# Patient Record
Sex: MALE | Race: WHITE
[De-identification: names, ages, dates, MRNs, and addresses within clinical notes are randomized per-mention and may not be internally consistent; named-entity substitution may affect disease eponyms.]

---

## 2019-08-13 ENCOUNTER — HOSPITAL ENCOUNTER (EMERGENCY)
Dept: HOSPITAL 75 - ER | Age: 49
Discharge: HOME | End: 2019-08-13
Payer: SELF-PAY

## 2019-08-13 VITALS — BODY MASS INDEX: 26.41 KG/M2 | WEIGHT: 195 LBS | HEIGHT: 72 IN

## 2019-08-13 VITALS — SYSTOLIC BLOOD PRESSURE: 147 MMHG | DIASTOLIC BLOOD PRESSURE: 98 MMHG

## 2019-08-13 DIAGNOSIS — T18.128A: Primary | ICD-10-CM

## 2019-08-13 DIAGNOSIS — J45.909: ICD-10-CM

## 2019-08-13 DIAGNOSIS — Z87.891: ICD-10-CM

## 2019-08-13 NOTE — CONSULTATION - SURGERY
GRAHAM DUBON,MED STUDENT 8/13/19 2217:


History of Present Illness


History of Present Illness


Patient Consulted On(coco/time)


8/13/19


 22:00


Date Seen by Provider:  Aug 13, 2019


Time Seen by Provider:  22:00


History of Present Illness


Consultation as requested for food bolus and esophageal obstruction. Patient 

seen and evaluated in ED





Patient presents with food bolus after eating chicken last night at 

approximately 9.00pm. He is spitting up his secretions and states that he cannot

get things to go down. He reports a history of similar episode 12 years ago 

after eating a piece of steak at which time he required a scope to push the 

bolus down and he was told he had scarring in the lower esophagus.





Allergies and Home Medications


Allergies


Coded Allergies:  


     No Known Drug Allergies (Unverified , 6/1/14)





Home Medications


Budesonide/Formoterol Fumarate 10.2 Gm Hfa.aer.ad, 2 PUFF IH BID, (Reported)


Pantoprazole Sodium 40 Mg Tablet.dr, 40 MG PO DAILY


   Prescribed by: THEODORA LOERA on 8/13/19 3950





Patient Home Medication List


Home Medication List Reviewed:  Yes





Past Medical-Social-Family Hx


Patient Social History


Alcohol Use:  Denies Use


Recreational Drug Use:  No


Smoking Status:  Former Smoker


Recent Foreign Travel:  No


Contact w/Someone Who Travel:  No


Recent Infectious Disease Expo:  No





Immunizations Up To Date


Tetanus Booster (TDap):  Less than 5yrs


PED Vaccines UTD:  Yes





Surgeries


History of Surgeries:  Yes (FOOD BOLUS REMOVED)


Surgeries:  Orthopedic





Respiratory


History of Respiratory Disorde:  Yes


Respiratory Disorders:  Asthma





Cardiovascular


History of Cardiac Disorders:  No





Neurological


History of Neurological Disord:  No





Gastrointestinal


History of Gastrointestinal Di:  No





Musculoskeletal


History of Musculoskeletal Dis:  No





Endocrine


History of Endocrine Disorders:  No





Cancer


History of Cancer:  No





Psychosocial


History of Psychiatric Problem:  No





Integumentary


History of Skin or Integumenta:  No





Blood Transfusions


History of Blood Disorders:  No





Review of Systems-General


Constitutional:  no symptoms reported


EENTM:  no symptoms reported


Respiratory:  no symptoms reported


Cardiovascular:  no symptoms reported


Gastrointestinal:  see HPI, dysphagia


Genitourinary:  no symptoms reported


Musculoskeletal:  no symptoms reported


Skin:  no symptoms reported


Psychiatric/Neurological:  No Symptoms Reported





Physical Exam-General Problems


Physical Exam


Vital Signs





Vital Signs - First Documented








 8/13/19





 19:55


 


Temp 98.2


 


Pulse 79


 


Resp 18


 


B/P (MAP) 159/99 (119)


 


Pulse Ox 97


 


O2 Delivery Room Air





Capillary Refill : Less Than 3 Seconds


General Appearance:  WD/WN, no apparent distress


HEENT:  PERRL/EOMI, pharynx normal


Neck:  non-tender, full range of motion, supple, normal inspection


Respiratory:  chest non-tender, no respiratory distress, no accessory muscle use


Cardiovascular:  regular rate, rhythm


Gastrointestinal:  non tender, soft


Rectal:  deferred


Back:  normal inspection, no CVA tenderness


Extremities:  normal range of motion, non-tender


Neurologic/Psychiatric:  no motor/sensory deficits, alert, normal mood/affect, 

oriented x 3


Skin:  normal color, warm/dry


Lymphatic:  no adenopathy





Assessment/Plan


Food bolus


Esophageal obstruction





Patient is unable to swallow his secretions so we will proceed with endoscopy. 

Risks and benefits of procedure discussed and patient wishes to proceed.





THEODORA LOERA DO 8/13/19 5131:


History of Present Illness


Patient with feeling of food lodged in esophagus since 9 pm yesterday.  Patient 

unable to keep secretions down and liquids.  Not eating today.  Nothing really 

making things better and trying to drink something makes worse.  Patient had 

previous episode with steak about 12 years ago. This occurred after eating 

chicken.  Patient had endoscopy 12 years ago and states was told had some 

scarring.  Patient no other complaints at this time.





Allergies and Home Medications


Allergies


Coded Allergies:  


     No Known Drug Allergies (Unverified , 6/1/14)





Home Medications


Budesonide/Formoterol Fumarate 10.2 Gm Hfa.aer.ad, 2 PUFF IH BID, (Reported)


Pantoprazole Sodium 40 Mg Tablet.dr, 40 MG PO DAILY


   Prescribed by: THEODORA LOERA on 8/13/19 5125





Patient Home Medication List


Home Medication List Reviewed:  Yes





Past Medical-Social-Family Hx


Patient Social History


Alcohol Use:  Denies Use


Recreational Drug Use:  No


Smoking Status:  Former Smoker





Surgeries


History of Surgeries:  Yes (FOOD BOLUS REMOVED)


Surgeries:  Orthopedic





Respiratory


History of Respiratory Disorde:  Yes


Respiratory Disorders:  Asthma





Family Medical History


Significant Family History:  No Pertinent Family Hx





Review of Systems-General


Constitutional:  no symptoms reported


EENTM:  no symptoms reported


Respiratory:  no symptoms reported


Cardiovascular:  no symptoms reported


Gastrointestinal:  see HPI, dysphagia


Genitourinary:  no symptoms reported


Musculoskeletal:  no symptoms reported


Skin:  no symptoms reported


Psychiatric/Neurological:  No Symptoms Reported





Physical Exam-General Problems


Physical Exam


General Appearance:  WD/WN, no apparent distress


HEENT:  PERRL/EOMI, pharynx normal


Neck:  non-tender, full range of motion, supple, normal inspection


Respiratory:  chest non-tender, no respiratory distress, no accessory muscle use


Cardiovascular:  regular rate, rhythm


Gastrointestinal:  non tender, soft, no organomegaly, no pulsatile mass


Rectal:  deferred


Back:  normal inspection, no CVA tenderness


Extremities:  normal range of motion, non-tender


Neurologic/Psychiatric:  CNs II-XII nml as tested, no motor/sensory deficits, 

alert, normal mood/affect, oriented x 3


Skin:  normal color, warm/dry


Lymphatic:  no adenopathy





Assessment/Plan


esophageal obstruction


food bolus





discussed risks and benefits of EGD to further evaluate and if obstruction to 

remove he understands all risks and benefits and wishes to proceed.





Patient taken for procedure and felt bolus pass.


Patient wishes to hold off.  He is able to keep liquids down without difficulty.


He wishes to be discharged.


Will start on protonix.


Follow up with me in 3 weeks and will plan outpatient EGD


Instructed if any issues before then be seen at that time.


Liquid diet  then advance


Patient understands and agrees with plan





Supervisory-Addendum Brief


Verification & Attestation


Participated in pt care:  history, MDM, physical


Personally performed:  exam, history, MDM, supervision of care


Care discussed with:  Medical Student


Procedures:  n/a


Results interpretation:  Verified all documentation


Verification and Attestation of Medical Student E/M Service





A medical student performed and documented this service in my presence. I 

reviewed and verified all information documented by the medical student and made

modifications to such information, when appropriate. I personally performed the 

physical exam and medical decision making. 





 Theodora Loera, Aug 13, 2019,23:48











GRAHAM DUBON,MED STUDENT       Aug 13, 2019 22:17


THEODORA LOERA DO              Aug 13, 2019 23:47

## 2019-08-13 NOTE — DISCHARGE INST-SIMPLE/STANDARD
Discharge Inst-Standard


Discharge Medications


New, Converted or Re-Newed RX:  Transmitted to Pharmacy





Patient Instructions/Follow Up


Plan of Care/Instructions/FU:  


Dr. Loera  appointment for 3 weeks.  Any issues before that be seen at


that time.


Activity as Tolerated:  Yes


Discharge Diet:  Liquid Diet (for 2 days then advance as tolerates.)











THEODORA LOERA DO              Aug 13, 2019 23:10

## 2019-08-13 NOTE — NUR
2230 TO ED WITH W/C TO TRANSFER PATIENT TO O.R. FOR EGD/FOOD BOLUS. A/O X 4, PERMIT SIGNED, 
H.L. PATENT LEFT AC SPACE WITH REPORT RECEIVED FROM PHI MCADAMS. 2240 PATIENT IN PRE OP 
HOLDING AREA AND STATES HE CAN NOW SWALLOW "IT WENT DOWN". DR. HAMMOND NOTIFIED AND CAME AND 
TALKED TO PATIENT AND PROCEDURE WAS CANCELED. 2300 TAKEN BACK TO E.D. VIA W/C AND PLACED IN 
ROOM #3 WITH REPORT GIVEN TO PHI FOR DISCHARGE, DR. HAMMOND TALKING WITH PATIENT AGAIN AT 
THIS TIME. NO CHANGE, NO C/O.

## 2019-08-15 NOTE — ED GI
General


Chief Complaint:  Oral/Throat Problems


Stated Complaint:  FOOD BOLUS


Nursing Triage Note:  


PT VERBALIZED THAT HE HAS A HX OF FOOD BOLUS THAT HAD TO BE REMOVED VIA SCOPE, 


PT STATES THAT HE ATE SOMETHING AT 2100 LAST NIGHT THAT HE FEELS IS STILL THERE,




PT VERBALIZED THAT HE CANT EVEN SWALLOW HIS SALIVA, BASIN PROVIDED, PROVIDER 


NOTIFIED.


Sepsis Screen:  No Definite Risk





History of Present Illness


Date Seen by Provider:  Aug 13, 2019


Time Seen by Provider:  20:23


Initial Comments


This 49-year-old gentleman presents to the emergency room with concerns about a 

possible food bolus.  He was eating chicken around 21:00 last night when he 

began having difficulty swallowing.  He has not been able to even swallow li

quids or his own saliva since then.  He had a similar issue several years ago 

and required endoscopy for removal of the food bolus.  He states this seems the 

same.  He denies any pain.





Allergies and Home Medications


Allergies


Coded Allergies:  


     No Known Drug Allergies (Unverified , 6/1/14)





Home Medications


Budesonide/Formoterol Fumarate 10.2 Gm Hfa.aer.ad, 2 PUFF IH BID, (Reported)


Pantoprazole Sodium 40 Mg Tablet.dr, 40 MG PO DAILY


   Prescribed by: THEODORA LOERA on 8/13/19 1980





Patient Home Medication List


Home Medication List Reviewed:  Yes





Review of Systems


Review of Systems


Constitutional:  no symptoms reported


EENTM:  No Symptoms Reported


Respiratory:  No Symptoms Reported


Cardiovascular:  No Symptoms Reported


Gastrointestinal:  See HPI


Genitourinary:  No Symptoms Reported


Musculoskeletal:  no symptoms reported


Skin:  no symptoms reported


Psychiatric/Neurological:  No Symptoms Reported


Endocrine:  No Symptoms Reported


Hematologic/Lymphatic:  No Symptoms Reported





Past Medical-Social-Family Hx


Past Med/Social Hx:  Reviewed Nursing Past Med/Soc Hx


Patient Social History


Alcohol Use:  Denies Use


Recreational Drug Use:  No


Smoking Status:  Former Smoker


Recent Foreign Travel:  No


Contact w/Someone Who Travel:  No


Recent Infectious Disease Expo:  No





Immunizations Up To Date


Tetanus Booster (TDap):  Less than 5yrs


PED Vaccines UTD:  Yes





Past Medical History


Surgeries:  Yes (FOOD BOLUS REMOVED)


Orthopedic


Respiratory:  Yes


Asthma


Cardiac:  No


Neurological:  No


Gastrointestinal:  No


Musculoskeletal:  No


Endocrine:  No


Cancer:  No


Psychosocial:  No


Integumentary:  No


Blood Disorders:  No





Family Medical History


No Pertinent Family Hx





Physical Exam


Vital Signs





Vital Signs - First Documented








 8/13/19





 19:55


 


Temp 98.2


 


Pulse 79


 


Resp 18


 


B/P (MAP) 159/99 (119)


 


Pulse Ox 97


 


O2 Delivery Room Air





Capillary Refill : Less Than 3 Seconds


Height/Weight/BMI


Height: 6'0"


Weight: 195lbs. oz. 88.545383ub;  BMI


Method:Stated


General Appearance:  WD/WN, no apparent distress


HEENT:  PERRL/EOMI, normal ENT inspection, pharynx normal


Neck:  normal inspection


Respiratory:  lungs clear, normal breath sounds, no respiratory distress, no 

accessory muscle use


Cardiovascular:  regular rate, rhythm, no edema, no murmur


Gastrointestinal:  normal bowel sounds, non tender, soft


Extremities:  normal inspection


Neurologic/Psychiatric:  no motor/sensory deficits, alert, normal mood/affect, 

oriented x 3


Skin:  normal color, warm/dry





Progress/Results/Core Measures


Results/Orders


My Orders





Orders - SANDRO NEFF MD


Glucagon Emergency Kit (Glucagon Emergen (8/13/19 20:30)


Succinylcholine Injection (Succinylcholi (8/13/19 23:25)


Propofol Injection (Diprivan Injection) (8/13/19 23:25)


Lidocaine 2% Pf 5 Ml (Xylocaine 2% Pf) (8/13/19 23:25)





Vital Signs/I&O











 8/13/19 8/13/19





 19:55 23:38


 


Temp 98.2 98.2


 


Pulse 79 57


 


Resp 18 18


 


B/P (MAP) 159/99 (119) 147/98 (114)


 


Pulse Ox 97 97


 


O2 Delivery Room Air Room Air














Blood Pressure Mean:                    114











Progress


Progress Note :  


Progress Note


Patient demonstrated inability to swallow his saliva.  IV was established and 

glucagon was administered.  After about 30 minutes patient was still unable to 

swallow.  Dr. Loera was consulted and planned to perform endoscopy for food 

bolus removal.  However, patient felt the bolus passed while being prepped.  He 

was then able to swallow and keep down fluids.  He was discharged home by Dr. Loera on Protonix.  Endoscopy was ultimately not necessary.





Departure


Impression





   Primary Impression:  


   Esophageal obstruction due to food impaction


Disposition:  01 HOME, SELF-CARE


Condition:  Stable





Departure-Patient Inst.


Referrals:  


NO,LOCAL PHYSICIAN (PCP)


Primary Care Physician


Patient Instructions:  EGD-ESOPHAGOGASTRODUODENOSCOPY


Scripts


Pantoprazole Sodium (Protonix) 40 Mg Tablet.


40 MG PO DAILY, #30 TAB 2 Refills


   Prov: THEODORA LOERA DO         8/13/19











SANDRO NEFF MD        Aug 15, 2019 00:54